# Patient Record
Sex: MALE | Race: WHITE | NOT HISPANIC OR LATINO | Employment: UNEMPLOYED | URBAN - METROPOLITAN AREA
[De-identification: names, ages, dates, MRNs, and addresses within clinical notes are randomized per-mention and may not be internally consistent; named-entity substitution may affect disease eponyms.]

---

## 2018-10-17 ENCOUNTER — OFFICE VISIT - RIVER FALLS (OUTPATIENT)
Dept: FAMILY MEDICINE | Facility: CLINIC | Age: 18
End: 2018-10-17

## 2019-09-11 ENCOUNTER — OFFICE VISIT - RIVER FALLS (OUTPATIENT)
Dept: FAMILY MEDICINE | Facility: CLINIC | Age: 19
End: 2019-09-11

## 2019-09-11 ASSESSMENT — MIFFLIN-ST. JEOR: SCORE: 1555.1

## 2019-09-12 LAB
B BURGDOR IGG+IGM SER QL: <0.9
BUN SERPL-MCNC: 17 MG/DL (ref 7–25)
BUN/CREAT RATIO - HISTORICAL: NORMAL (ref 6–22)
C REACTIVE PROTEIN LHE: 81 MG/L
CALCIUM SERPL-MCNC: 10.1 MG/DL (ref 8.6–10.3)
CHLORIDE BLD-SCNC: 101 MMOL/L (ref 98–110)
CO2 SERPL-SCNC: 26 MMOL/L (ref 20–32)
CREAT SERPL-MCNC: 0.99 MG/DL (ref 0.6–1.35)
EGFRCR SERPLBLD CKD-EPI 2021: 109 ML/MIN/1.73M2
ERYTHROCYTE [DISTWIDTH] IN BLOOD BY AUTOMATED COUNT: 12 % (ref 11–15)
GLUCOSE BLD-MCNC: 85 MG/DL (ref 65–99)
HCT VFR BLD AUTO: 44 % (ref 38.5–50)
HGB BLD-MCNC: 15 GM/DL (ref 13.2–17.1)
MCH RBC QN AUTO: 30.4 PG (ref 27–33)
MCHC RBC AUTO-ENTMCNC: 34.1 GM/DL (ref 32–36)
MCV RBC AUTO: 89.1 FL (ref 80–100)
PLATELET # BLD AUTO: 157 10*3/UL (ref 140–400)
PMV BLD: 12.1 FL (ref 7.5–12.5)
POTASSIUM BLD-SCNC: 4.7 MMOL/L (ref 3.5–5.3)
RBC # BLD AUTO: 4.94 10*6/UL (ref 4.2–5.8)
SODIUM SERPL-SCNC: 136 MMOL/L (ref 135–146)
WBC # BLD AUTO: 9.9 10*3/UL (ref 3.8–10.8)

## 2019-09-16 ENCOUNTER — COMMUNICATION - RIVER FALLS (OUTPATIENT)
Dept: FAMILY MEDICINE | Facility: CLINIC | Age: 19
End: 2019-09-16

## 2019-09-17 ENCOUNTER — COMMUNICATION - RIVER FALLS (OUTPATIENT)
Dept: FAMILY MEDICINE | Facility: CLINIC | Age: 19
End: 2019-09-17

## 2021-10-11 ENCOUNTER — AMBULATORY - RIVER FALLS (OUTPATIENT)
Dept: FAMILY MEDICINE | Facility: CLINIC | Age: 21
End: 2021-10-11

## 2021-10-11 ENCOUNTER — LAB REQUISITION (OUTPATIENT)
Dept: LAB | Facility: CLINIC | Age: 21
End: 2021-10-11
Payer: COMMERCIAL

## 2021-10-11 DIAGNOSIS — U07.1 COVID-19: ICD-10-CM

## 2021-10-11 PROCEDURE — U0005 INFEC AGEN DETEC AMPLI PROBE: HCPCS | Mod: ORL | Performed by: FAMILY MEDICINE

## 2021-10-13 LAB — SARS-COV-2 RNA RESP QL NAA+PROBE: NOT DETECTED

## 2021-10-14 LAB — SARS-COV-2 RNA RESP QL NAA+PROBE: NEGATIVE

## 2021-12-31 ENCOUNTER — AMBULATORY - RIVER FALLS (OUTPATIENT)
Dept: FAMILY MEDICINE | Facility: CLINIC | Age: 21
End: 2021-12-31

## 2022-01-28 ENCOUNTER — AMBULATORY - RIVER FALLS (OUTPATIENT)
Dept: FAMILY MEDICINE | Facility: CLINIC | Age: 22
End: 2022-01-28

## 2022-02-11 VITALS
WEIGHT: 132.6 LBS | TEMPERATURE: 101.8 F | SYSTOLIC BLOOD PRESSURE: 120 MMHG | HEIGHT: 67 IN | DIASTOLIC BLOOD PRESSURE: 74 MMHG | HEART RATE: 74 BPM | BODY MASS INDEX: 20.81 KG/M2

## 2022-02-11 VITALS
WEIGHT: 133 LBS | SYSTOLIC BLOOD PRESSURE: 106 MMHG | HEART RATE: 74 BPM | DIASTOLIC BLOOD PRESSURE: 70 MMHG | TEMPERATURE: 97.4 F | OXYGEN SATURATION: 98 %

## 2022-02-16 NOTE — PROGRESS NOTES
Patient:   LOREN DELATORRE            MRN: [id number]            FIN: [id number]               Age:   17 years     Sex:  Male     :  2000   Associated Diagnoses:   Eustachian tube dysfunction; Right otitis media   Author:   Bob Valverde PA-C      Chief Complaint   10/17/2018 4:31 PM CDT   here for poss ear infection, says he has pressure in right ear, hard to pop, noticed this when he woke up in Monday morning, has heard bubbling in ear, denies ear pain, says he has some sinus congestion and a slight cough        History of Present Illness   Chief complaint and symptoms noted above and confirmed with patient   as above, pressure in right ear but not much pain, feels fluid in ear  has taken some OTC cold medicine      Review of Systems   Constitutional:  Fever.    Ear/Nose/Mouth/Throat:  Nasal congestion, sinus pressure.         Ear pain: Right.    Respiratory:  Cough.       Health Status   Allergies:    Allergic Reactions (Selected)  Severity Not Documented  Amoxicillin (Rash)  Penicillin (No reactions were documented)   Medications: not on any regular medications   Problem list:    All Problems  Reactive Airway Disease / ICD-9-.90 / Confirmed      Histories   Past Medical History:    Active  Reactive Airway Disease (493.90)  Resolved  No previous hospitalizations:  Resolved.   Family History:    No family history items have been selected or recorded.   Procedure history:    No previous procedures.      Physical Examination   Vital Signs   10/17/2018 4:31 PM CDT Temperature Tympanic 97.4 DegF  LOW    Peripheral Pulse Rate 74 bpm    Pulse Site Radial artery    Systolic Blood Pressure 106 mmHg    Diastolic Blood Pressure 70 mmHg    Mean Arterial Pressure 82 mmHg    BP Site Right arm    Oxygen Saturation 98 %      Measurements from flowsheet : Measurements   10/17/2018 4:31 PM CDT   Weight Measured - Standard                133 lb     General:  No acute distress.    HENT:  No pharyngeal erythema, No  sinus tenderness, nares are patent, right TM is red and bulging, mild inflammation of left TM.    Neck:  Supple, Non-tender, No lymphadenopathy.    Respiratory:  Lungs are clear to auscultation.       Impression and Plan   Diagnosis     Eustachian tube dysfunction (YEA04-ZP H69.80).     Right otitis media (PZE91-ED H66.91).     Summary:  will treat with zpak and atrovent nasal spray, continue with expectorants/decongestants, follow up if not improving.    Orders     Orders   Pharmacy:  Atrovent 42 mcg/inh nasal spray (Prescribe): 2 spray(s), nasal, qid, PRN: for nasal congestion, # 1 EA, 2 Refill(s), Type: Maintenance, Pharmacy: [facility], 2 spray(s) Nasal qid,PRN:for nasal congestion  Zithromax Z-David 250 mg oral tablet (Prescribe): Take 2 tablets on Day 1 and then 1 tablet, PO, Daily, Instructions: until finished, # 6 tab(s), 0 Refill(s), Type: Maintenance, Pharmacy: [facility], Take 2 tablets on Day 1 and then 1 tablet Oral daily,Instr:until finished.     Orders   Charges (Evaluation and Management):  [id number] office outpatient visit 15 minutes (Charge) (Order): Quantity: 1, Right otitis media  Eustachian tube dysfunction.

## 2022-02-16 NOTE — LETTER
(Inserted Image. Unable to display)   September 16, 2019      LOREN DELATORRE  [address] [place], [place] [id number]        Dear LOREN,      Thank you for selecting [facility] for your healthcare needs.       lymes test came back negative but his C reactive protein is very elevated.  Did he take the medication or was it too hard to tolerate.   How is he feeling today??      Result Name Current Result Reference Range   Visit Summary   9/11/2019    Lab Report   9/11/2019    General Progress Note (Physician)   9/11/2019    Glucose Level (mg/dL)  85 9/11/2019 65 - 99   BUN (mg/dL)  17 9/11/2019 7 - 25   Creatinine Level (mg/dL)  0.99 9/11/2019 0.60 - 1.35   eGFR (mL/min/1.73m2)  109 9/11/2019 > OR = 60 -    eGFR  (mL/min/1.73m2)  127 9/11/2019 > OR = 60 -    Sodium Level (mmol/L)  136 9/11/2019 135 - 146   Potassium Level (mmol/L)  4.7 9/11/2019 3.5 - 5.3   Chloride Level (mmol/L)  101 9/11/2019 98 - 110   CO2 Level (mmol/L)  26 9/11/2019 20 - 32   Calcium Level (mg/dL)  10.1 9/11/2019 8.6 - 10.3   WBC  9.9 9/11/2019 3.8 - 10.8   RBC  4.94 9/11/2019 4.20 - 5.80   Hgb (gm/dL)  15.0 9/11/2019 13.2 - 17.1   Hct (%)  44.0 9/11/2019 38.5 - 50.0   MCV (fL)  89.1 9/11/2019 80.0 - 100.0   MCH (pg)  30.4 9/11/2019 27.0 - 33.0   MCHC (gm/dL)  34.1 9/11/2019 32.0 - 36.0   RDW (%)  12.0 9/11/2019 11.0 - 15.0   Platelet  157 9/11/2019 140 - 400   MPV (fL)  12.1 9/11/2019 7.5 - 12.5   Lyme Ab IgG/IgM WB  <0.90 9/11/2019    C-Reactive Protein (CRP) (mg/L) ((H)) 81.0 9/11/2019  - <8.0       Please contact me or my assistant at [phone] you have any questions or concerns.     Sincerely,        Kevyn Maciel MD

## 2022-02-16 NOTE — TELEPHONE ENCOUNTER
"---------------------From: Mayda Pickett CMA To: [facility] Message Pool ([id number]_[facility]);   Sent: 9/17/2019 2:24:31 PM CDTSubject: Results PCP:   Harish  Time of Call:  1418   Person Calling:  james Hardy motherPhone number:  [phone] patientsNote:   Mother states patient has been calling all morning. He was leaving messages, but didn't realize he was being recorded. Would only say \"hello\". He did say explicit language thinking he was being hung up on. Mother states he is looking for results. Letter attached. Please call to explain what a high C Reactive Protein means in this case.Last office visit and reason:  9/11/19 Arthralgias w/ Nathan for Shelton to call back at 2:44pm  "

## 2022-02-16 NOTE — TELEPHONE ENCOUNTER
---------------------  From: Robina Zhang CMA   To: SHELTON DELATORRE    Sent: 9/17/2019 4:20:32 PM CDT  Subject: General Message     Hi Shelton,   I spoke with Dr. Maciel and he states that your lymes test is negative. Your testing did show a very elevated C- Reactive Protein which is a lab that shows that you have an infection but it's doesn't give us any other specifics about the infection. The Doxycycline you where started on is a good medication to treat this.  He was wondering how you where feeling and if you have been able to continue the doxycycline.    Lilli

## 2022-02-16 NOTE — TELEPHONE ENCOUNTER
---------------------  From: Mayda Pickett CMA (Phone Messages Pool ([id number]_[facility]))   To: [facility] Message Pool ([id number]_[place]);     Sent: 9/17/2019 3:24:17 PM CDT  Subject: FW: Test results           ---------------------  From: SHELTON DELATORRE  To: [facility]  Sent: 09/17/2019 03:13 p.m. CDT  Subject: Test results  How do I know what the Lyme test results are?---------------------  From: Robina Zhang CMA ([facility] Message Pool ([id number]_[facility]))   To: Kevyn Maciel MD;     Sent: 9/17/2019 3:41:18 PM CDT  Subject: FW: Test results---------------------  From: Kevyn Maciel MD   To: [facility] Message Pool ([id number]_[place]);     Sent: 9/17/2019 3:54:14 PM CDT  Subject: RE: Test results     you are negative for Lymes test... How is he doing?message was sent to Shelton through the portal

## 2022-02-16 NOTE — TELEPHONE ENCOUNTER
---------------------  From: Robina Zhang CMA ([facility] Message Pool ([id number]_[facility]))   To: Kevyn Maciel MD;     Sent: 9/16/2019 3:43:24 PM CDT  Subject: FW: phone message     Have you seen results yet?      ---------------------  From: Kendal Pina CMA (Phone Messages Pool ([id number]_[facility]))   To: [facility] Message Pool (32224_[place]);     Sent: 9/16/2019 3:38:47 PM CDT  Subject: phone message     PCP:   JOSEFINA     Time of Call:  1536       Person Calling:  Patients mother (no CHI)  Phone number:  [phone]    Returned call at: n/a    Note:   Patients mom called requesting lab results from 9/11/19. I do not see a release of information to speak with mom. Please advise on labs.     Last office visit and reason:  9/11/19- arthralgiasI CHAD for Shelton to call back. I wanted to let him know that we got his lab results back. The lymes test came back neg. but his C-reactive protein is very elevated at 81.0. He was wondering if he has continued to take the medication or was it too hard for him to tolerate. He is also wondering how he is feeling today? If pt's mom calls we will need to get permission from Shelton to talk with her. I did send the results out in the mail today.Hayley BONILLA at 5:16pm. Returned call and informed Hayley we are unable to give results to her as pt is over 18 and no signed CHI. Hayley says pt is at work and not able to answer his phone. Gave # [phone] to call pt (updated in chart). She says pt is at work and unable to answer his phone.    Asked pt to call back to receive results. Explained unable to speak with his mom as he is over 18.

## 2022-02-16 NOTE — TELEPHONE ENCOUNTER
---------------------  From: Mayda Pickett CMA (Phone Messages Pool ([id number]_[facility]))   To: JOSEFINA Message Pool ([id number]_[place]);     Sent: 9/17/2019 4:58:49 PM CDT  Subject: FW: Update           ---------------------  From: LOREN DELATORRE  To: [facility]  Sent: 09/17/2019 04:51 p.m. CDT  Subject: Update  No. I still the meds after 2 days because I threw up when I took them but felt much better without them.  A little bit of body aches but otherwise feel better. Went back to work Saturday. I called to let sorknow that I couldn t continue with the meds but they said I had to make another appointment and I couldn t because by the time they called on Friday the clinic was closing in 15 min. I don t know if there is something I could be prescribed without coming in---------------------  From: Robina Zhang CMA ([facility] Message Pool ([id number]_[facility]))   To: Kevyn Maciel MD;     Sent: 9/17/2019 5:07:19 PM CDT  Subject: FW: Update---------------------  From: Kevyn Maciel MD   To: Phone Messages Pool ([id number]);     Sent: 9/17/2019 7:23:33 PM CDT  Subject: RE: Update     i would take azithromycin 500 po q day for 5 days  ** Submitted: **  Order:azithromycin (azithromycin 500 mg oral tablet)  1 tab(s)  Oral  daily  Qty:  5 tab(s)        Duration:  5 day(s)        Refills:  0          Substitutions Allowed     Route To Pharmacy - [id number]    Signed by Kevyn Maciel MD  9/17/2019 7:23:00 PM---------------------  From: Mayda Pickett CMA ([employer] Pool ([id number]))   To: LOREN DELATORRE    Sent: 9/17/2019 7:28:31 PM CDT  Subject: FW: Update

## 2022-02-16 NOTE — PROGRESS NOTES
Patient:   LOREN DELATORRE            MRN: [id number]            FIN: [id number]               Age:   18 years     Sex:  Male     :  2000   Associated Diagnoses:   None   Author:   Kevyn Maciel MD       -   Today's date:  2019 12:19:00 PM .        -   To whom it may concern:        This patient is currently under my care and Was seen in my office on  2019.  .     Please excuse him/ her from work, for the next  2  days.  Please contact me if you have any questions or concerns.      -   Sincerely,

## 2022-02-16 NOTE — TELEPHONE ENCOUNTER
---------------------  From: Colleen Merchant CMA (Phone Messages Pool ([address] [place] [address]))   To: [facility] ([address] [place] [address]);     Sent: 9/13/2019 1:26:32 PM CDT  Subject: antibiotic/vomiting     Phone message    PCP: JOSEFINA  Person calling: Giulia yepez ( no CHI on file)  Phone number: [phone]  Time message left: 1257  Return call time: _    Reason: Mom called and left a message stating that Shelton was seen on Wednesday by JOSEFINA and prescribed an antibiotic (doxycyline)  she stated that every time he takes the medication he vomits, she is requesting a new antibiotic be sent to pharmacy.  Please advise. Pt need to make apt? ok for new Rx?    Allergies: amoxicillin, penicillin      LOV: 9/11/19 arthralgias with JOSEFINA     Transferred to: [facility] level          CHAD Merchant CMA---------------------  From: Bob Valverde PA-C ([facility] Provider Pool ([address] [place] [address]))   To: Phone Messages Pool ([address] [place] [address]);     Sent: 9/13/2019 3:06:49 PM CDT  Subject: RE: antibiotic/vomiting     He should be re-evaluated in clinic so we can choose the appropriate abxCalled and LVM informing him that he should make apt for re-eval he can call and make apt or UC this weekend.           CHAD Merchant CMA---------------------  From: Sejal Boggs LPN (Phone Messages Pool ([address] [place] [address]))   To: [facility] ([address] [place] [address]);     Sent: 9/13/2019 3:43:12 PM CDT  Subject: FW: antibiotic/vomiting     Pt's mom Hayley BONILLA stating she does not want to bring pt in and pay for another office visit because symptoms have not changed.  She says she just wants pt to get a new Rx.---------------------  From: Lynne Dugan ([facility] Practice Provider Pool ([id number]_[facility]))   To: Phone Messages Pool (32224_[place]);     Sent: 9/13/2019 4:25:48 PM CDT  Subject: RE: antibiotic/vomiting     There is concern with his symptoms and elevated C reactive protein and fever  that something is being missed. I also believe he should be re evaluated.Hayley again advised that pt needs appt due to continued symptoms and per her last message unchanged symptoms. Hayley then says pt is getting better just has vomiting every time he takes a dose.    Tried to explain that he needs to be re evaluated. She is upset that it costs her every time to bring him in. Informed of urgent care hours.

## 2022-02-16 NOTE — TELEPHONE ENCOUNTER
"---------------------  From: Sejal Boggs LPN (Phone Messages Pool ([id number]_[facility]))   To: [facility] Message Pool ([id number]_[place]);     Sent: 9/11/2019 11:11:07 AM CDT  Subject: ongoing headache     FYI- pt scheduled with JOSEFINA at 12pm today    Phone Message    PCP:   JOSEFINA      Time of Call: 10:20am       Person Calling:  pt mom Hayley  Phone number:  [phone]- ask for Hayley- (number is for her work)    Returned call at: 11:00am    Note:   Hayley LM stating she is not sure if she should be bringing pt in. She says pt has never had migraines before. She says pt says his head has been \"killing him\" since yesterday. She says it is bad enough that he has vomited a couple time.    She is concerned because there is a family history of aneurysms.    Pt does have appt scheduled today with JOSEFINA.     Called Hayley and advised keeping appt. She says he has tried tylenol and ibuprofen which did not work. Pt did try going to work yesterday but says he was nervous driving because of the pain.    Last office visit and reason:  10/17/18 otitis media, eustachian tube dysfunction---------------------  From: Robina Zhang CMA ([facility] Message Pool ([id number]_[facility]))   To: Kevyn Maciel MD;     Sent: 9/11/2019 11:51:53 AM CDT  Subject: FW: ongoing headache  "

## 2022-02-16 NOTE — PROGRESS NOTES
Chief Complaint    c/o muscle pain, joint pain , migraine.  History of Present Illness      18-year-old here if not feeling well.      Says for the last 3 days he has been very achy.  Some of his ankles knees hips and arms.  He is felt very tired to move morning to sleep.  And it just generally tires out when he is up moving around.  Today's had a headache although he has not had any nausea vomiting and no visual changes.  He has not had any rashes.  He does not consider himself to be outdoor oriented       He denies any numbness or tingling,  Review of Systems      See HPI.  All other review of systems negative.  Physical Exam   Vitals & Measurements    T: 101.8   F (Tympanic)  HR: 74(Peripheral)  BP: 120/74     HT: 67 in  WT: 132.6 lb  BMI: 20.77       Alert and oriented      Supple neck no facial tenderness no paralysis normal oropharynx white sclera      Lungs are clear to auscultation       Heart regular rate        Good range of motion elbow wrist and hands without obvious swelling        No lower extremity edema  Assessment/Plan       Malaise (R53.81)         General malaise he could have a viral syndrome but I am concerned about Lyme's disease.  He does not seem to be an intra-abdominal process going on.  He has a headache but there is no stiffness or anything to suggest CNS products as where his main symptoms are really more arthralgias and myalgias         Ordered:          doxycycline, = 1 tab(s) ( 100 mg ), Oral, bid, x 10 day(s), # 20 tab(s), 0 Refill(s), Type: Acute, Pharmacy: [facility] [id number] [facility], 1 tab(s) Oral bid,x10 day(s), (Ordered)                Orders:         Basic Metabolic Panel* ([facility]), Specimen Type: Serum, Collection Date: 09/11/19 12:18:00 CDT         C-Reactive Protein* ([facility]), Specimen Type: Serum, Collection Date: 09/11/19 12:18:00 CDT         CBC (h/h, RBC, indices, WBC, Plt)* ([facility]), Specimen Type: Blood, Collection Date: 09/11/19 12:18:00 CDT         Lyme disease antibody, total,  eia with reflex to western blot (igg,igm)* ([facility]), Specimen Type: Serum, Collection Date: 09/11/19 12:18:00 CDT  Patient Information     Name:LOREN DELATORRE      Address:      [address] [place] [id number]     Sex:Male     YOB: 2000     [phone]     Emergency Contact:SRINIVAS DELATORRE     MRN:[id number]     FIN:[id number]     Location[facility]     Date of Service:09/11/2019      Primary Care Physician:       Kevyn Maciel MD, [phone]      Attending Physician:       Kevyn Maciel MD, [phone]  Problem List/Past Medical History    Ongoing     Reactive Airway Disease    Historical     No previous hospitalizations  Procedure/Surgical History     No previous procedures  Medications    doxycycline hyclate 100 mg oral tablet, 100 mg= 1 tab(s), Oral, bid  Allergies    amoxicillin (Rash)    penicillin  Social History    Smoking Status - 09/11/2019     Never smoker     Alcohol - Denies Alcohol Use, 09/15/2012     Exercise - Regular exercise, 09/15/2012     Substance Abuse - Denies Substance Abuse, 09/15/2012     Tobacco - Denies Tobacco Use, 09/15/2012  Immunizations      Vaccine Date Status      varicella 08/12/2010 Recorded      tetanus/diphth/pertuss (Tdap) adult/adol 08/12/2010 Recorded      tetanus/diphth/pertuss (Tdap) adult/adol 08/12/2010 Recorded      DTaP 08/28/2003 Recorded      MMR (measles/mumps/rubella) 08/28/2003 Recorded      IPV 08/28/2003 Recorded      DTaP 06/13/2000 Recorded      Hib (PRP-T) 06/13/2000 Recorded      MMR (measles/mumps/rubella) 06/13/2000 Recorded      IPV 06/13/2000 Recorded      varicella 12/15/1999 Recorded      DTaP 05/28/1999 Recorded      Hep B 05/28/1999 Recorded      Hib (PRP-T) 05/28/1999 Recorded      DTaP 03/30/1999 Recorded      Hib (PRP-T) 03/30/1999 Recorded      IPV 03/30/1999 Recorded      DTaP 02/09/1999 Recorded      Hep B 02/09/1999 Recorded      Hib (PRP-T) 02/09/1999 Recorded      IPV  02/09/1999 Recorded      Hep B 2000 Recorded  Lab Results          Lab Results (Last 4 results within 90 days)           Sodium Level: 136 mmol/L [135 mmol/L - 146 mmol/L] (09/11/19 12:25:00)          Potassium Level: 4.7 mmol/L [3.5 mmol/L - 5.3 mmol/L] (09/11/19 12:25:00)          Chloride Level: 101 mmol/L [98 mmol/L - 110 mmol/L] (09/11/19 12:25:00)          CO2 Level: 26 mmol/L [20 mmol/L - 32 mmol/L] (09/11/19 12:25:00)          Glucose Level: 85 mg/dL [65 mg/dL - 99 mg/dL] (09/11/19 12:25:00)          BUN: 17 mg/dL [7 mg/dL - 25 mg/dL] (09/11/19 12:25:00)          Creatinine Level: 0.99 mg/dL [0.6 mg/dL - 1.35 mg/dL] (09/11/19 12:25:00)          BUN/Creat Ratio: NOT APPLICABLE [6  - 22] (09/11/19 12:25:00)          eGFR: 109 mL/min/1.73m2 (09/11/19 12:25:00)          eGFR African American: 127 mL/min/1.73m2 (09/11/19 12:25:00)          Calcium Level: 10.1 mg/dL [8.6 mg/dL - 10.3 mg/dL] (09/11/19 12:25:00)          C-Reactive Protein (CRP): 81 mg/L High (09/11/19 12:25:00)          WBC: 9.9 [3.8  - 10.8] (09/11/19 12:25:00)          RBC: 4.94 [4.2  - 5.8] (09/11/19 12:25:00)          Hgb: 15 gm/dL [13.2 gm/dL - 17.1 gm/dL] (09/11/19 12:25:00)          Hct: 44 % [38.5 % - 50 %] (09/11/19 12:25:00)          MCV: 89.1 fL [80 fL - 100 fL] (09/11/19 12:25:00)          MCH: 30.4 pg [27 pg - 33 pg] (09/11/19 12:25:00)          MCHC: 34.1 gm/dL [32 gm/dL - 36 gm/dL] (09/11/19 12:25:00)          RDW: 12 % [11 % - 15 %] (09/11/19 12:25:00)          Platelet: 157 [140  - 400] (09/11/19 12:25:00)          MPV: 12.1 fL [7.5 fL - 12.5 fL] (09/11/19 12:25:00)          Lyme Ab IgG/IgM WB: <0.90 (09/11/19 12:25:00)

## 2022-04-27 NOTE — NURSING NOTE
Comprehensive Intake Entered On:  9/11/2019 12:12 PM CDT    Performed On:  9/11/2019 12:05 PM CDT by Robina Zhang CMA               Summary   Chief Complaint :   c/o muscle pain, joint pain , migraine.   Menstrual Status :   N/A   Weight Measured :   132.6 lb(Converted to: 132 lb 10 oz, 60.15 kg)    Height Measured :   67 in(Converted to: 5 ft 7 in, 170.18 cm)    Body Mass Index :   20.77 kg/m2   Body Surface Area :   1.68 m2   Systolic Blood Pressure :   120 mmHg   Diastolic Blood Pressure :   74 mmHg   Mean Arterial Pressure :   89 mmHg   Peripheral Pulse Rate :   74 bpm   Temperature Tympanic :   101.8 DegF(Converted to: 38.8 DegC)  (HI)    Robina Zhang CMA - 9/11/2019 12:05 PM CDT   Health Status   Allergies Verified? :   Yes   Medication History Verified? :   Yes   Pre-Visit Planning Status :   Completed   Tobacco Use? :   Never smoker   Robina Zhang CMA - 9/11/2019 12:05 PM CDT   Consents   Consent for Immunization Exchange :   Consent Granted   Consent for Immunizations to Providers :   Consent Granted   Robina Zhang CMA - 9/11/2019 12:05 PM CDT   Meds / Allergies   (As Of: 9/11/2019 12:12:57 PM CDT)   Allergies (Active)   amoxicillin  Estimated Onset Date:   Unspecified ; Reactions:   Rash ; Created By:   Fadumo Gonzales; Reaction Status:   Active ; Category:   Drug ; Substance:   amoxicillin ; Type:   Allergy ; Updated By:   Fadumo Gonzales; Reviewed Date:   10/17/2018 5:05 PM CDT      penicillin  Estimated Onset Date:   Unspecified ; Created By:   Fadumo Gonzales; Reaction Status:   Active ; Category:   Drug ; Substance:   penicillin ; Type:   Allergy ; Updated By:   Fadumo Gonzales; Reviewed Date:   10/17/2018 5:05 PM CDT        Medication List   (As Of: 9/11/2019 12:12:57 PM CDT)   Prescription/Discharge Order    azithromycin  :   azithromycin ; Status:   Processing ; Ordered As Mnemonic:   Zithromax Z-David 250 mg oral tablet ; Ordering Provider:   Bob Valverde PA-C; Action  Fax received from [facility] regarding Pt  Pt location at SNF: [facility]  Fax sent by: Fax regarding concern: labs    Assessment:         Any recommendations or new orders? Display:   Complete ; Catalog Code:   azithromycin ; Order Dt/Tm:   9/11/2019 12:11:11 PM          ipratropium nasal  :   ipratropium nasal ; Status:   Processing ; Ordered As Mnemonic:   Atrovent 42 mcg/inh nasal spray ; Ordering Provider:   Bob Valverde PA-C; Action Display:   Complete ; Catalog Code:   ipratropium nasal ; Order Dt/Tm:   9/11/2019 12:11:11 PM

## 2024-01-02 ENCOUNTER — HOSPITAL ENCOUNTER (EMERGENCY)
Facility: CLINIC | Age: 24
Discharge: HOME | End: 2024-01-02
Payer: COMMERCIAL

## 2024-01-02 VITALS
TEMPERATURE: 98 F | OXYGEN SATURATION: 96 % | HEART RATE: 88 BPM | DIASTOLIC BLOOD PRESSURE: 82 MMHG | RESPIRATION RATE: 16 BRPM | SYSTOLIC BLOOD PRESSURE: 132 MMHG

## 2024-01-02 DIAGNOSIS — M79.659: ICD-10-CM

## 2024-01-02 PROCEDURE — G0463 HOSPITAL OUTPT CLINIC VISIT: HCPCS

## 2024-01-02 PROCEDURE — 99213 OFFICE O/P EST LOW 20 MIN: CPT

## 2024-01-02 RX ORDER — NALTREXONE HYDROCHLORIDE 50 MG/1
50 TABLET, FILM COATED ORAL DAILY
COMMUNITY

## 2024-01-02 RX ORDER — MIRTAZAPINE 15 MG/1
15 TABLET, ORALLY DISINTEGRATING ORAL AT BEDTIME
COMMUNITY

## 2024-01-02 RX ORDER — BUPROPION HYDROCHLORIDE 150 MG/1
150 TABLET ORAL EVERY MORNING
COMMUNITY

## 2024-01-02 RX ORDER — HYDROXYZINE HYDROCHLORIDE 25 MG/1
25 TABLET, FILM COATED ORAL 3 TIMES DAILY PRN
COMMUNITY

## 2024-01-02 ASSESSMENT — ACTIVITIES OF DAILY LIVING (ADL): ADLS_ACUITY_SCORE: 33

## 2024-01-02 NOTE — DISCHARGE INSTRUCTIONS
I suspect this is likely a muscle strain or tendonitis. Continue with current OTC treatments, stretches, and follow-up with physical therapy. Return for new concern or worsening symptoms

## 2024-01-02 NOTE — ED TRIAGE NOTES
Pt reports he currently resides at [facility] for treatment. States he has left hip pain x2 months with no known injury

## 2024-01-03 NOTE — ED PROVIDER NOTES
History     Chief Complaint   Patient presents with    Hip Pain     HPI  Shelton Nj is a 23 year old male who presents to urgent care for concern of left upper leg pain.  Patient states pain ongoing for the past several months in the left upper thigh and into the groin area.  Patient denies any injury.  He is currently residing at [facility] for alcohol use.  Reports symptoms have continued and stated he wanted to have this looked at given he is working on his health and seeking treatment at [facility].  Patient states the pain is often worse at night.  He does not have any significant worsening symptoms with ambulating or bearing weight.  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  States pain can improve with stretching but it is never completely improved.  Does not have any overlying skin changes.  He denies any testicular pain or urinary changes.  He denies any low back pain.  He has also tried over-the-counter pain medications such as Tylenol and ibuprofen without any significant relief in his symptoms.  He has no other new concerns at this time.    Allergies:  Allergies   Allergen Reactions    Penicillins Hives    Penicillin G        Problem List:    There are no problems to display for this patient.       Past Medical History:    No past medical history on file.    Past Surgical History:    No past surgical history on file.    Family History:    No family history on file.    Social History:  Marital Status:  Single [1]        Medications:    buPROPion (WELLBUTRIN XL) 150 MG 24 hr tablet  hydrOXYzine HCl (ATARAX) 25 MG tablet  mirtazapine (REMERON SOL-TAB) 15 MG ODT  naltrexone (DEPADE/REVIA) 50 MG tablet          Review of Systems   All other systems reviewed and are negative.    See HPI  Physical Exam   BP: 132/82  Pulse: 88  Temp: 98  F (36.7  C)  Resp: 16  SpO2: 96 %      Physical  Exam  Constitutional:       General: He is not in acute distress.     Appearance: He is well-developed. He is not diaphoretic.   HENT:      Head: Normocephalic and atraumatic.      Nose: No congestion.      Mouth/Throat:      Mouth: Mucous membranes are moist.   Eyes:      General: No scleral icterus.     Conjunctiva/sclera: Conjunctivae normal.   Cardiovascular:      Rate and Rhythm: Normal rate.   Pulmonary:      Effort: Pulmonary effort is normal.   Abdominal:      General: Abdomen is flat.   Musculoskeletal:      Cervical back: Normal range of motion and neck supple.        Legs:    Skin:     General: Skin is warm and dry.      Capillary Refill: Capillary refill takes less than 2 seconds.      Findings: No rash.   Neurological:      Mental Status: He is alert and oriented to person, place, and time.         ED Course                 Procedures         No results found for this or any previous visit (from the past 24 hour(s)).    Medications - No data to display    Assessments & Plan (with Medical Decision Making)   Patient presented to urgent care for concern of left-sided leg and hip pain.  He is afebrile on arrival and vital signs are otherwise reassuring.  History and exam as above.  There are no overlying skin changes concerning for cellulitis.  I am not concerned for a DVT at this time.  Patient is able to ambulate without any significant difficulty.  There is no notable weakness in the leg.  There is no traumatic injury and there is no indication for an x-ray at this time.  I suspect this is likely musculoskeletal in nature and it is likely a tendinitis, bursitis, muscle strain.  May be some involvement of the abductor muscle or psoas referral was placed for physical therapy for additional treatment and evaluation.  To continue with over-the-counter treatments such as Tylenol and ibuprofen and continue with stretching at home.  Return precautions for new or worsening symptoms were reviewed.  Patient was  discharged in good condition and is agreeable to the plan.    I have reviewed the nursing notes.    I have reviewed the findings, diagnosis, plan and need for follow up with the patient.        Discharge Medication List as of 1/2/2024  5:18 PM          Final diagnoses:   Musculoskeletal thigh pain       1/2/2024   [facility] EMERGENCY DEPT    Disclaimer:  This note consists of symbols derived from keyboarding, dictation and/or voice recognition software.  As a result, there may be errors in the script that have gone undetected.  Please consider this when interpreting information found in this chart.         Tha Mares, MALIK CNP  01/02/24 [id number]